# Patient Record
Sex: FEMALE | Race: WHITE | NOT HISPANIC OR LATINO
[De-identification: names, ages, dates, MRNs, and addresses within clinical notes are randomized per-mention and may not be internally consistent; named-entity substitution may affect disease eponyms.]

---

## 2018-04-11 PROBLEM — Z00.00 ENCOUNTER FOR PREVENTIVE HEALTH EXAMINATION: Status: ACTIVE | Noted: 2018-04-11

## 2018-04-17 ENCOUNTER — APPOINTMENT (OUTPATIENT)
Dept: ORTHOPEDIC SURGERY | Facility: CLINIC | Age: 48
End: 2018-04-17
Payer: MEDICARE

## 2018-04-17 VITALS — HEIGHT: 63 IN | BODY MASS INDEX: 26.4 KG/M2 | WEIGHT: 149 LBS

## 2018-04-17 DIAGNOSIS — Z78.9 OTHER SPECIFIED HEALTH STATUS: ICD-10-CM

## 2018-04-17 DIAGNOSIS — Z80.9 FAMILY HISTORY OF MALIGNANT NEOPLASM, UNSPECIFIED: ICD-10-CM

## 2018-04-17 PROCEDURE — 99204 OFFICE O/P NEW MOD 45 MIN: CPT

## 2018-04-30 DIAGNOSIS — M75.02 ADHESIVE CAPSULITIS OF LEFT SHOULDER: ICD-10-CM

## 2018-05-08 DIAGNOSIS — M75.42 IMPINGEMENT SYNDROME OF LEFT SHOULDER: ICD-10-CM

## 2021-09-29 ENCOUNTER — APPOINTMENT (OUTPATIENT)
Dept: ORTHOPEDIC SURGERY | Facility: CLINIC | Age: 51
End: 2021-09-29
Payer: MEDICAID

## 2021-09-29 ENCOUNTER — RESULT REVIEW (OUTPATIENT)
Age: 51
End: 2021-09-29

## 2021-09-29 ENCOUNTER — OUTPATIENT (OUTPATIENT)
Dept: OUTPATIENT SERVICES | Facility: HOSPITAL | Age: 51
LOS: 1 days | End: 2021-09-29
Payer: MEDICAID

## 2021-09-29 VITALS — WEIGHT: 157 LBS | RESPIRATION RATE: 16 BRPM | HEIGHT: 63 IN | BODY MASS INDEX: 27.82 KG/M2

## 2021-09-29 PROCEDURE — 73610 X-RAY EXAM OF ANKLE: CPT | Mod: 26,RT

## 2021-09-29 PROCEDURE — 73630 X-RAY EXAM OF FOOT: CPT | Mod: 26,RT

## 2021-09-29 PROCEDURE — 99214 OFFICE O/P EST MOD 30 MIN: CPT

## 2021-09-29 PROCEDURE — 73610 X-RAY EXAM OF ANKLE: CPT

## 2021-09-29 PROCEDURE — 73630 X-RAY EXAM OF FOOT: CPT

## 2021-10-04 ENCOUNTER — NON-APPOINTMENT (OUTPATIENT)
Age: 51
End: 2021-10-04

## 2021-10-20 ENCOUNTER — APPOINTMENT (OUTPATIENT)
Dept: ORTHOPEDIC SURGERY | Facility: CLINIC | Age: 51
End: 2021-10-20
Payer: MEDICAID

## 2021-10-20 ENCOUNTER — OUTPATIENT (OUTPATIENT)
Dept: OUTPATIENT SERVICES | Facility: HOSPITAL | Age: 51
LOS: 1 days | End: 2021-10-20
Payer: MEDICAID

## 2021-10-20 ENCOUNTER — APPOINTMENT (OUTPATIENT)
Dept: RADIOLOGY | Facility: CLINIC | Age: 51
End: 2021-10-20

## 2021-10-20 ENCOUNTER — RESULT REVIEW (OUTPATIENT)
Age: 51
End: 2021-10-20

## 2021-10-20 VITALS — RESPIRATION RATE: 16 BRPM | BODY MASS INDEX: 27.82 KG/M2 | HEIGHT: 63 IN | WEIGHT: 157 LBS

## 2021-10-20 PROCEDURE — 28470 CLTX METATARSAL FX WO MNP EA: CPT | Mod: RT

## 2021-10-20 PROCEDURE — 77085 DXA BONE DENSITY AXL VRT FX: CPT

## 2021-10-20 PROCEDURE — 99214 OFFICE O/P EST MOD 30 MIN: CPT | Mod: 25

## 2021-10-20 PROCEDURE — 77085 DXA BONE DENSITY AXL VRT FX: CPT | Mod: 26

## 2021-10-21 ENCOUNTER — NON-APPOINTMENT (OUTPATIENT)
Age: 51
End: 2021-10-21

## 2021-10-29 ENCOUNTER — NON-APPOINTMENT (OUTPATIENT)
Age: 51
End: 2021-10-29

## 2021-11-01 ENCOUNTER — TRANSCRIPTION ENCOUNTER (OUTPATIENT)
Age: 51
End: 2021-11-01

## 2021-11-02 ENCOUNTER — APPOINTMENT (OUTPATIENT)
Dept: ORTHOPEDIC SURGERY | Facility: CLINIC | Age: 51
End: 2021-11-02
Payer: MEDICAID

## 2021-11-02 ENCOUNTER — TRANSCRIPTION ENCOUNTER (OUTPATIENT)
Age: 51
End: 2021-11-02

## 2021-11-02 PROCEDURE — 99024 POSTOP FOLLOW-UP VISIT: CPT

## 2021-11-04 ENCOUNTER — NON-APPOINTMENT (OUTPATIENT)
Age: 51
End: 2021-11-04

## 2021-11-04 ENCOUNTER — TRANSCRIPTION ENCOUNTER (OUTPATIENT)
Age: 51
End: 2021-11-04

## 2021-12-01 ENCOUNTER — RESULT REVIEW (OUTPATIENT)
Age: 51
End: 2021-12-01

## 2021-12-01 ENCOUNTER — APPOINTMENT (OUTPATIENT)
Dept: ORTHOPEDIC SURGERY | Facility: CLINIC | Age: 51
End: 2021-12-01
Payer: MEDICAID

## 2021-12-01 ENCOUNTER — OUTPATIENT (OUTPATIENT)
Dept: OUTPATIENT SERVICES | Facility: HOSPITAL | Age: 51
LOS: 1 days | End: 2021-12-01
Payer: MEDICAID

## 2021-12-01 VITALS — WEIGHT: 157 LBS | RESPIRATION RATE: 16 BRPM | HEIGHT: 63 IN | BODY MASS INDEX: 27.82 KG/M2

## 2021-12-01 DIAGNOSIS — M21.6X1 OTHER ACQUIRED DEFORMITIES OF RIGHT FOOT: ICD-10-CM

## 2021-12-01 DIAGNOSIS — Q66.70 CONGEN PES CAVUS, UNSP FOOT: ICD-10-CM

## 2021-12-01 PROCEDURE — 73630 X-RAY EXAM OF FOOT: CPT

## 2021-12-01 PROCEDURE — 73630 X-RAY EXAM OF FOOT: CPT | Mod: 26,RT

## 2021-12-01 PROCEDURE — 99024 POSTOP FOLLOW-UP VISIT: CPT

## 2021-12-01 RX ORDER — DIAZEPAM 2 MG/1
2 TABLET ORAL
Qty: 3 | Refills: 0 | Status: DISCONTINUED | COMMUNITY
Start: 2018-04-24 | End: 2021-12-01

## 2022-02-23 ENCOUNTER — APPOINTMENT (OUTPATIENT)
Dept: ORTHOPEDIC SURGERY | Facility: CLINIC | Age: 52
End: 2022-02-23
Payer: MEDICAID

## 2022-02-23 ENCOUNTER — RESULT REVIEW (OUTPATIENT)
Age: 52
End: 2022-02-23

## 2022-02-23 ENCOUNTER — OUTPATIENT (OUTPATIENT)
Dept: OUTPATIENT SERVICES | Facility: HOSPITAL | Age: 52
LOS: 1 days | End: 2022-02-23
Payer: MEDICAID

## 2022-02-23 VITALS — BODY MASS INDEX: 27.82 KG/M2 | HEIGHT: 63 IN | RESPIRATION RATE: 16 BRPM | WEIGHT: 157 LBS

## 2022-02-23 DIAGNOSIS — M84.374A STRESS FRACTURE, RIGHT FOOT, INITIAL ENCOUNTER FOR FRACTURE: ICD-10-CM

## 2022-02-23 PROCEDURE — 73630 X-RAY EXAM OF FOOT: CPT

## 2022-02-23 PROCEDURE — 73630 X-RAY EXAM OF FOOT: CPT | Mod: 26,RT

## 2022-02-23 PROCEDURE — 99214 OFFICE O/P EST MOD 30 MIN: CPT

## 2022-02-23 RX ORDER — LISDEXAMFETAMINE DIMESYLATE 10 MG/1
CAPSULE ORAL
Refills: 0 | Status: ACTIVE | COMMUNITY

## 2022-02-23 RX ORDER — ESCITALOPRAM OXALATE 5 MG/1
TABLET, FILM COATED ORAL
Refills: 0 | Status: ACTIVE | COMMUNITY

## 2022-03-07 ENCOUNTER — TRANSCRIPTION ENCOUNTER (OUTPATIENT)
Age: 52
End: 2022-03-07

## 2022-04-27 ENCOUNTER — APPOINTMENT (OUTPATIENT)
Dept: ORTHOPEDIC SURGERY | Facility: CLINIC | Age: 52
End: 2022-04-27
Payer: MEDICAID

## 2022-04-27 VITALS — BODY MASS INDEX: 27.82 KG/M2 | WEIGHT: 157 LBS | RESPIRATION RATE: 16 BRPM | HEIGHT: 63 IN

## 2022-04-27 DIAGNOSIS — R22.41 LOCALIZED SWELLING, MASS AND LUMP, RIGHT LOWER LIMB: ICD-10-CM

## 2022-04-27 PROCEDURE — 99213 OFFICE O/P EST LOW 20 MIN: CPT

## 2022-04-27 NOTE — DISCUSSION/SUMMARY
[de-identified] : 4/27/22\par 51-year-old female with subcutaneous mass over the dorsal midfoot that leads to compression neuritis over dorsal midfoot with shoewear.  The patient is scheduled for mass removal about her dorsal midfoot.  We discussed at length all the patient's questions and concerns regarding the surgical intervention as well as appropriate expectations for postoperative period.  The patient is concerned about being able to do her work activities including providing 2 hours in the postoperative period.  The patient was explained that she should dedicate enough time for appropriate healing the postoperative period.  Due to the nature of the patient's work and ambulatory status we will use nylon sutures for closure total limit risk of dehiscence.  The patient will be allowed to walk postoperatively.  We will follow-up the pathology postoperatively.  \par Plan for surgery rescheduled

## 2022-04-27 NOTE — PHYSICAL EXAM
[de-identified] : Pleasant well-appearing 50-year-old female\par \par Right foot\par 1 x 0.5 cm superficial mass over dorsal midfoot\par Mild tenderness to palpation with compression\par Resolution of neuritic type symptoms of the dorsal midfoot\par Resolution of tenderness to palpation over the fourth toe\par No crepitus\par No false motion\par No tenderness to palpation over the third metatarsal base\par No residual swelling  over the right forefoot\par Negative piano key testing\par Negative midfoot compression test\par Appearance: Skin is intact. There is no skin breakdown. There were no lesions. There is no erythema\par Vascular: Warm and well perfused foot. There is a palpable 2+ dorsalis pedis and posterior tibialis artery. Brisk capillary refill to all 5 digits. \par Sensation otherwise intact to light touch in saphenous, sural, superficial peroneal, deep peroneal, and tibial nerve distributions. \par Motors: Able to fire extensor hallucis longus, flexor hallucis longus, tibialis anterior, gastrocsoleus complex. 5 out of 5 strength in all other myotomes. \par Lymph: No evidence of lymphedema, venous stasis ulcers, or pitting edema  [de-identified] : 02/23/2022 Weightbearing X-rays three views of the right foot were ordered, obtained at [[facility]] and interpreted by me today and reviewed with the patient showing: \par Healed fourth metatarsal base fracture\par \par 12/01/2021 Weightbearing X-rays three views of the right foot were ordered, obtained at [[facility]] and interpreted by me today and reviewed with the patient showing:\par Bridging callus about the fourth metatarsal base\par No evidence of displacement\par Minimal soft tissue swelling\par \par Moderate metatarsal stacking\par \par 10/20/21 MRI [w/o contrast] of right foot performed at [[facility]       } on [10/11/21] were interpreted by me today and reviewed with the patient showing:\par Fourth metatarsal base stress fracture\par Early degenerative changes about the first metatarsophalangeal joint\par No evidence of sarcomatous or metastatic mass about the dorsal midfoot\par \par DEXA scan was reviewed which shows no evidence of osteoporosis or osteopenia.\par \par 09/29/2021 Weightbearing X-rays three views of the right foot and ankle were ordered, obtained at [[facility]] and interpreted by me today and reviewed with the patient showing: \par Mild metatarsal stacking\par No evidence of displaced fracture subluxations or dislocations\par Mild soft tissue swelling over the right forefoot and midfoot\par Ankle mortise intact\par Increased calcaneal pitch consistent with pes cavus\par No evidence of periosteal reaction or osseous lytic lesion

## 2022-04-27 NOTE — HISTORY OF PRESENT ILLNESS
[FreeTextEntry1] : 51-year-old female with healed right fourth metatarsal base stress fracture. She remains to have her superficial soft tissue mass over her dorsal forefoot. She presents WB in regular shoes and reports that pain is a 2/10 in severity. She adds that she would like to move her surgery to a week later.

## 2022-04-28 ENCOUNTER — TRANSCRIPTION ENCOUNTER (OUTPATIENT)
Age: 52
End: 2022-04-28

## 2022-05-03 ENCOUNTER — APPOINTMENT (OUTPATIENT)
Age: 52
End: 2022-05-03

## 2022-05-17 ENCOUNTER — APPOINTMENT (OUTPATIENT)
Age: 52
End: 2022-05-17

## 2022-06-01 ENCOUNTER — APPOINTMENT (OUTPATIENT)
Dept: ORTHOPEDIC SURGERY | Facility: CLINIC | Age: 52
End: 2022-06-01

## 2023-01-13 ENCOUNTER — APPOINTMENT (OUTPATIENT)
Dept: OTOLARYNGOLOGY | Facility: CLINIC | Age: 53
End: 2023-01-13
Payer: MEDICAID

## 2023-01-13 VITALS
TEMPERATURE: 97.7 F | BODY MASS INDEX: 29.77 KG/M2 | SYSTOLIC BLOOD PRESSURE: 102 MMHG | DIASTOLIC BLOOD PRESSURE: 70 MMHG | HEIGHT: 63 IN | WEIGHT: 168 LBS | OXYGEN SATURATION: 98 % | HEART RATE: 64 BPM

## 2023-01-13 DIAGNOSIS — R09.89 OTHER SPECIFIED SYMPTOMS AND SIGNS INVOLVING THE CIRCULATORY AND RESPIRATORY SYSTEMS: ICD-10-CM

## 2023-01-13 DIAGNOSIS — H61.23 IMPACTED CERUMEN, BILATERAL: ICD-10-CM

## 2023-01-13 DIAGNOSIS — H91.93 UNSPECIFIED HEARING LOSS, BILATERAL: ICD-10-CM

## 2023-01-13 DIAGNOSIS — K21.9 GASTRO-ESOPHAGEAL REFLUX DISEASE W/OUT ESOPHAGITIS: ICD-10-CM

## 2023-01-13 DIAGNOSIS — K13.79 OTHER LESIONS OF ORAL MUCOSA: ICD-10-CM

## 2023-01-13 PROCEDURE — 31579 LARYNGOSCOPY TELESCOPIC: CPT

## 2023-01-13 PROCEDURE — 99205 OFFICE O/P NEW HI 60 MIN: CPT | Mod: 25

## 2023-01-13 RX ORDER — OMEPRAZOLE 40 MG/1
40 CAPSULE, DELAYED RELEASE ORAL
Qty: 90 | Refills: 0 | Status: ACTIVE | COMMUNITY
Start: 2023-01-13 | End: 1900-01-01

## 2023-01-13 RX ORDER — CHOLECALCIFEROL (VITAMIN D3) 1250 MCG
1.25 MG CAPSULE ORAL
Qty: 10 | Refills: 0 | Status: DISCONTINUED | COMMUNITY
Start: 2021-10-20 | End: 2023-01-13

## 2023-01-13 RX ORDER — BUPROPION HYDROCHLORIDE 150 MG/1
150 TABLET, FILM COATED ORAL
Refills: 0 | Status: ACTIVE | COMMUNITY

## 2023-01-13 RX ORDER — MELOXICAM 15 MG/1
15 TABLET ORAL
Qty: 90 | Refills: 0 | Status: DISCONTINUED | COMMUNITY
Start: 2021-09-29 | End: 2023-01-13

## 2023-01-13 RX ORDER — FAMOTIDINE 20 MG/1
20 TABLET, FILM COATED ORAL
Qty: 90 | Refills: 0 | Status: ACTIVE | COMMUNITY
Start: 2023-01-13 | End: 1900-01-01

## 2023-01-13 NOTE — ASSESSMENT
[FreeTextEntry1] : 52 year old female presents with throat discomfort, globus sensation and odynophagia for several months. Based on history and physical exam, I do believe there is a component of laryngopharyngeal reflux. At this time I am recommending dietary and behavioral change to reduce acid in the diet. I am also recommending omeprazole as well famotidine for a 3 months trial. Follow up in 3 months for repeat evaluation. \par \par Of note, bilateral EAC's were cleaned without issue. \par \par Plan: \par - Dietary and behavioral modification to reduce acid reflux, handout given \par - Omeprazole qd as well as Famotidine qhs \par - Voice hygiene, increase hydration, sips of water throughout the day, avoid throat clearing\par - fu 3 months for repeat evaluation \par - audio/tymp

## 2023-01-13 NOTE — PROCEDURE
[FreeTextEntry3] : -\par Cerumen Removal/Ear Cleaning for Otitis Externa\par Pre-operative Diagnosis: bilateral Cerumen Impaction\par Post-operative Diagnosis: Same\par Procedure: Binocular microscopy with cerumen removal- 19714\par Procedure Details: \par The patient was placed in the supine position. The operating microscope was positioned. I then placed the ear speculum in the EAC. Cerumen was then removed using a mixture of otologic curettes, and suction. The TM was noted to be intact. I then performed the procedure of the opposite ear in similar fashion. The patient tolerated procedure well.\par \par Findings: \par Bilateral Ear Canal - normal\par Bilateral Tympanic Membrane - normal\par \par Recommendations: Debrox\par Complications: None\par \par  [de-identified] : -\par Procedure: Flexible Laryngoscopy with Stroboscopy\par \par Pre-operative Diagnosis: throat discomfort \par Post-operative Diagnosis: laryngopharyngeal reflux\par Anesthesia: Topical - 1% Lidocaine/Phenylephrine \par \par Procedure Details: \par The patient was placed in the sitting position. After decongestant and anesthesia were applied the laryngoscope was passed. The nasal cavities, nasopharynx, oropharynx, hypopharynx, and larynx were all examined. Vocal folds were examined during respiration and phonation. The following findings were noted:\par \par Findings: \par Nose: Septum is midline, turbinates are normal, nasal airways patent, mucosa normal\par Nasopharynx: Adenoids normal, no masses, eustachian tube normal\par Oropharynx: Pharyngeal walls symmetric and without lesion. Tonsils/fossae symmetric\par Hypopharynx: Hypopharynx and pyriform sinuses without lesion. No masses or asymmetry. No pooling of secretions.\par Larynx: Epiglottis and aryepiglottic folds were sharp and crisp bilaterally. Bilateral false vocal folds normal appearance. Airway was widely patent.\par \par Strobe Exam Ratings\par 		\par TVF Appearance: normal \par TVF Mobility: normal \par Edema/hypertrophy: normal \par Mucus on TVF: none\par Glottic Closure: adequate \par Mucosal Wave: normal \par Amplitude of Vibration: normal \par Phase: symmetric \par Supraglottic Hyperfunction: none\par Other Findings:\par \par Condition: Stable. Patient tolerated procedure well.\par \par Complications: None\par \par

## 2023-01-13 NOTE — PROCEDURE
[FreeTextEntry3] : -\par Cerumen Removal/Ear Cleaning for Otitis Externa\par Pre-operative Diagnosis: bilateral Cerumen Impaction\par Post-operative Diagnosis: Same\par Procedure: Binocular microscopy with cerumen removal- [id number]\par Procedure Details: \par The patient was placed in the supine position. The operating microscope was positioned. I then placed the ear speculum in the EAC. Cerumen was then removed using a mixture of otologic curettes, and suction. The TM was noted to be intact. I then performed the procedure of the opposite ear in similar fashion. The patient tolerated procedure well.\par \par Findings: \par Bilateral Ear Canal - normal\par Bilateral Tympanic Membrane - normal\par \par Recommendations: Debrox\par Complications: None\par \par  [de-identified] : -\par Procedure: Flexible Laryngoscopy with Stroboscopy\par \par Pre-operative Diagnosis: throat discomfort \par Post-operative Diagnosis: laryngopharyngeal reflux\par Anesthesia: Topical - 1% Lidocaine/Phenylephrine \par \par Procedure Details: \par The patient was placed in the sitting position. After decongestant and anesthesia were applied the laryngoscope was passed. The nasal cavities, nasopharynx, oropharynx, hypopharynx, and larynx were all examined. Vocal folds were examined during respiration and phonation. The following findings were noted:\par \par Findings: \par Nose: Septum is midline, turbinates are normal, nasal airways patent, mucosa normal\par Nasopharynx: Adenoids normal, no masses, eustachian tube normal\par Oropharynx: Pharyngeal walls symmetric and without lesion. Tonsils/fossae symmetric\par Hypopharynx: Hypopharynx and pyriform sinuses without lesion. No masses or asymmetry. No pooling of secretions.\par Larynx: Epiglottis and aryepiglottic folds were sharp and crisp bilaterally. Bilateral false vocal folds normal appearance. Airway was widely patent.\par \par Strobe Exam Ratings\par 		\par TVF Appearance: normal \par TVF Mobility: normal \par Edema/hypertrophy: normal \par Mucus on TVF: none\par Glottic Closure: adequate \par Mucosal Wave: normal \par Amplitude of Vibration: normal \par Phase: symmetric \par Supraglottic Hyperfunction: none\par Other Findings:\par \par Condition: Stable. Patient tolerated procedure well.\par \par Complications: None\par \par

## 2023-01-13 NOTE — PHYSICAL EXAM
[Midline] : trachea located in midline position [Laryngoscopy Performed] : laryngoscopy was performed, see procedure section for findings [Normal] : no rashes [de-identified] : bilateral EACs cleaned without issue

## 2023-01-13 NOTE — HISTORY OF PRESENT ILLNESS
[de-identified] : 1/13/23\par 52F presents with odynophagia and "lump on left side of throat". Symptoms started a few months ago and are intermittent. Not worse after meals. + excessive throat clearing. She also reports coughing fits that are brought on by laughing. No difficulty chewing, eating or swallowing. No breathing issues. No voice changes. No other ENT issues. She was told that she has acid reflux in the past and was started on Nexium.\par \par She reports chronic "sinus headaches" on the left side. Denies nasal congestion, drainage. Denies changes in sense of smell or taste or dental pain. \par \par Diet:\par + caffeine, chocolate, tomato, citrus, garlic, onion, carbonated beverages, blueberries, spicy foods\par - mint\par Glasses of water per day: almost none\par Late night eating: yes\par Alcohol use, especially at night:

## 2023-01-13 NOTE — PHYSICAL EXAM
[Midline] : trachea located in midline position [Laryngoscopy Performed] : laryngoscopy was performed, see procedure section for findings [Normal] : no rashes [de-identified] : bilateral EACs cleaned without issue

## 2023-01-13 NOTE — HISTORY OF PRESENT ILLNESS
[de-identified] : 1/13/23\par 52F presents with odynophagia and "lump on left side of throat". Symptoms started a few months ago and are intermittent. Not worse after meals. + excessive throat clearing. She also reports coughing fits that are brought on by laughing. No difficulty chewing, eating or swallowing. No breathing issues. No voice changes. No other ENT issues. She was told that she has acid reflux in the past and was started on Nexium.\par \par She reports chronic "sinus headaches" on the left side. Denies nasal congestion, drainage. Denies changes in sense of smell or taste or dental pain. \par \par Diet:\par + caffeine, chocolate, tomato, citrus, garlic, onion, carbonated beverages, blueberries, spicy foods\par - mint\par Glasses of water per day: almost none\par Late night eating: yes\par Alcohol use, especially at night:

## 2023-01-23 ENCOUNTER — APPOINTMENT (OUTPATIENT)
Dept: OTOLARYNGOLOGY | Facility: CLINIC | Age: 53
End: 2023-01-23
Payer: MEDICAID

## 2023-01-23 DIAGNOSIS — R13.10 DYSPHAGIA, UNSPECIFIED: ICD-10-CM

## 2023-01-23 PROCEDURE — 99212 OFFICE O/P EST SF 10 MIN: CPT | Mod: 25

## 2023-01-23 PROCEDURE — 31575 DIAGNOSTIC LARYNGOSCOPY: CPT

## 2023-01-23 RX ORDER — SULFASALAZINE 500 MG/1
500 TABLET, DELAYED RELEASE ORAL
Qty: 120 | Refills: 0 | Status: ACTIVE | COMMUNITY
Start: 2022-08-23

## 2023-01-23 RX ORDER — BUPROPION HYDROCHLORIDE 150 MG/1
150 TABLET, EXTENDED RELEASE ORAL
Qty: 30 | Refills: 0 | Status: ACTIVE | COMMUNITY
Start: 2023-01-09

## 2023-01-23 RX ORDER — ESCITALOPRAM OXALATE 10 MG/1
10 TABLET ORAL
Qty: 30 | Refills: 0 | Status: ACTIVE | COMMUNITY
Start: 2023-01-09

## 2023-01-23 NOTE — CONSULT LETTER
[Dear  ___] : Dear  [unfilled], [Courtesy Letter:] : I had the pleasure of seeing your patient, [unfilled], in my office today. [Consult Closing:] : Thank you very much for allowing me to participate in the care of this patient.  If you have any questions, please do not hesitate to contact me. [Sincerely,] : Sincerely, [FreeTextEntry3] : Migel Fam MD\par Department of Otolaryngology - Head and Neck Surgery\par [facility]

## 2023-01-23 NOTE — HISTORY OF PRESENT ILLNESS
[de-identified] : 52F here for initial evaluation.\par \par For the past 2 months or so she c/o uncomfortable and painful feeling in her throat on the left side only when swallowing. It has gotten worse during this time. Other than swallowing there are no triggers. There is no difficulty eating, breathing, swallowing or talking; there is no weight loss or bad breath. \par She had seen ENT who reported reflux and prescribed PPH/H2B but pt did not take them yet.\par No etoh or tobacco; occ MJ.\par Very poor diet- caffeine, chocolate, tomato, citrus, garlic, onion, carbonated beverages, blueberries, spicy foods, late night eating.\par \par ROS otherwise unremarkable

## 2023-01-23 NOTE — ASSESSMENT
[FreeTextEntry1] : 52F here for initial evaluation. For the past 2 months or so she c/o an uncomfortable and painful feeling in her throat on the left side only when swallowing. It has gotten worse during this time. Other than swallowing there are no triggers. There is no difficulty eating, breathing, swallowing or talking; there is no weight loss or bad breath. She had seen ENT who reported sx due to reflux and prescribed PPH/H2B but pt did not take them yet. She has a reflux unfriendly diet as above. On exam, there is pharyngeal cobblestoning and erythema. Fiberoptic laryngoscopy shows mild postcricoid edema.\par Unclear etiology to sx, but I agree it is likely laryngopharyngeal reflux given exam and history. However, her sx are persisting >2months and getting worse so to be complete will get CT neck and RTO thereafter. Should scan be unremarkable (expected) than would treat for reflux.

## 2023-01-23 NOTE — PHYSICAL EXAM
[FreeTextEntry1] : +throat clearing [de-identified] : soft, flat [Midline] : trachea located in midline position [Laryngoscopy Performed] : laryngoscopy was performed, see procedure section for findings [de-identified] : pharyngeal cobblestoning and erythema [Normal] : no rashes

## 2023-01-23 NOTE — PROCEDURE
[FreeTextEntry3] : Fiberoptic Laryngoscopy:\par upper airway widely patent\par TVF symmetric and mobile\par no masses\par mild postcricoid edema

## 2023-01-27 ENCOUNTER — OUTPATIENT (OUTPATIENT)
Dept: OUTPATIENT SERVICES | Facility: HOSPITAL | Age: 53
LOS: 1 days | End: 2023-01-27
Payer: MEDICAID

## 2023-01-27 ENCOUNTER — APPOINTMENT (OUTPATIENT)
Dept: CT IMAGING | Facility: HOSPITAL | Age: 53
End: 2023-01-27

## 2023-01-27 PROCEDURE — 70491 CT SOFT TISSUE NECK W/DYE: CPT

## 2023-01-27 PROCEDURE — 70491 CT SOFT TISSUE NECK W/DYE: CPT | Mod: 26

## 2023-02-14 ENCOUNTER — APPOINTMENT (OUTPATIENT)
Dept: OTOLARYNGOLOGY | Facility: CLINIC | Age: 53
End: 2023-02-14

## 2023-04-10 ENCOUNTER — APPOINTMENT (OUTPATIENT)
Dept: OTOLARYNGOLOGY | Facility: CLINIC | Age: 53
End: 2023-04-10

## 2023-04-21 ENCOUNTER — APPOINTMENT (OUTPATIENT)
Dept: OTOLARYNGOLOGY | Facility: CLINIC | Age: 53
End: 2023-04-21

## 2023-05-09 ENCOUNTER — APPOINTMENT (OUTPATIENT)
Dept: OTOLARYNGOLOGY | Facility: CLINIC | Age: 53
End: 2023-05-09
Payer: MEDICAID

## 2023-05-09 DIAGNOSIS — R07.0 PAIN IN THROAT: ICD-10-CM

## 2023-05-09 PROCEDURE — 31575 DIAGNOSTIC LARYNGOSCOPY: CPT

## 2023-05-09 PROCEDURE — 99214 OFFICE O/P EST MOD 30 MIN: CPT | Mod: 25

## 2023-05-09 NOTE — PROCEDURE
[FreeTextEntry3] : Fiberoptic Laryngoscopy (from prior):\par upper airway widely patent\par TVF symmetric and mobile\par no masses\par mild postcricoid edema

## 2023-05-09 NOTE — ASSESSMENT
[FreeTextEntry1] : 52F here in followup. For the past 6 months or so she c/o uncomfortable and painful feeling in her neck/throat on the left side, only when swallowing or moving her neck to the left. It has gotten worse during this time. Other than swallowing or movement there are no triggers. There is no difficulty eating, breathing, swallowing or talking; there is no weight loss or bad breath. She tried PPI/H2B and diet/lifestyle changes with no improvement. CT neck, though substantially degraded by motion and dental artifact, shows a 1.5cm focus of heterogeneous soft tissue density inferior to the central hyoid that may represent a thyroglossal duct remnant with otherwise no other masses or lesions. Complete and comprehensive head and neck exam is unremarkable.\par Unclear etiology to sx as her objective exam and imaging are unremarkable. The small likely TGDC on imaging is likely incidental and no where near her c/o lower left sided neck pain. Doubt LPR since no improvement on PPI/H2B w diet/lifestyle. At this point, would send back to PCP - perhaps this is musculoskeletal? c-spine related? No acute ENT intervention at this time.

## 2023-05-09 NOTE — CONSULT LETTER
[Dear  ___] : Dear  [unfilled], [Courtesy Letter:] : I had the pleasure of seeing your patient, [unfilled], in my office today. [Consult Closing:] : Thank you very much for allowing me to participate in the care of this patient.  If you have any questions, please do not hesitate to contact me. [Sincerely,] : Sincerely, [FreeTextEntry3] : Migel Fam MD\par Department of Otolaryngology - Head and Neck Surgery\par [facility]

## 2023-05-09 NOTE — HISTORY OF PRESENT ILLNESS
[de-identified] : 52F here in followup.\par \par For the past 6 months or so she c/o uncomfortable and painful feeling in her neck/throat on the left side, only when swallowing or moving her neck to the left. It has gotten worse during this time. Other than swallowing or movement there are no triggers. There is no difficulty eating, breathing, swallowing or talking; there is no weight loss or bad breath. \par She tried PPI/H2B and diet/lifestyle changes with no improvement. \par No etoh or tobacco; occ MJ.\par \par CT Neck 1/27/23 (I reviewed images):\par -On a study that is substantially degraded by motion and dental artifact, there is the appearance of a 1.5 cm focus of heterogeneous soft tissue density inferior to the central hyoid that may represent a thyroglossal duct remnant. \par \par ROS otherwise unremarkable

## 2023-05-09 NOTE — DATA REVIEWED
[de-identified] : CT Neck 1/27/23:\par Findings:\par \par The study is substantially degraded both by motion and dental artifact.\par \par *  Aerodigestive Tract: Within the exam limitations, no large aerodigestive tract mass is identified. There is an approximately 1.5 cm focus of heterogeneous soft tissue density immediately inferior to the central hyoid that may represent a thyroglossal duct remnant. There is a vague multiseptated appearance that may reflect inflammation though, again, accurate characterization isn't possible on this limited examination.\par \par *  Lymph Nodes: No cervical lymphadenopathy\par \par *  Salivary Glands: No large mass.\par \par *  Thyroid Gland: Normal\par \par *  Orbits: Normal\par \par *  Brain: Included portions are grossly unremarkable\par \par *  Skull Base: Intact\par \par *  Paranasal Sinuses: Clear\par \par *  Mastoids: Clear\par \par *  Cervical Spine: Normal vertebral body height and alignment.\par \par *  Lung Apices: No large apical lung mass\par \par \par IMPRESSION:\par \par On a study that is substantially degraded by motion and dental artifact, there is the appearance of a 1.5 cm focus of heterogeneous soft tissue density inferior to the central hyoid that may represent a thyroglossal duct remnant. Should confirmation and/or further imaging characterization be desired, ultrasound may be performed. MRI will be more sensitive to motion related image degradation.\par

## 2023-05-09 NOTE — PHYSICAL EXAM
[FreeTextEntry1] : +throat clearing [de-identified] : soft, flat, no masses/lesions, no LAD [Midline] : trachea located in midline position [Laryngoscopy Performed] : laryngoscopy was performed, see procedure section for findings [de-identified] : pharyngeal cobblestoning and erythema [Normal] : no rashes

## 2023-07-27 ENCOUNTER — APPOINTMENT (OUTPATIENT)
Dept: SURGERY | Facility: CLINIC | Age: 53
End: 2023-07-27
Payer: MEDICAID

## 2023-07-27 VITALS
WEIGHT: 178 LBS | HEIGHT: 63 IN | HEART RATE: 60 BPM | BODY MASS INDEX: 31.54 KG/M2 | SYSTOLIC BLOOD PRESSURE: 121 MMHG | DIASTOLIC BLOOD PRESSURE: 81 MMHG

## 2023-07-27 DIAGNOSIS — Q89.2 CONGENITAL MALFORMATIONS OF OTHER ENDOCRINE GLANDS: ICD-10-CM

## 2023-07-27 PROCEDURE — 99204 OFFICE O/P NEW MOD 45 MIN: CPT

## 2023-07-27 NOTE — REASON FOR VISIT
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

## 2023-07-27 NOTE — ASSESSMENT
[FreeTextEntry1] : discussed options for management including observation vs excision. risks, benefits and alternatives discussed at length.  I have discussed with the patient the anatomy of the area, the pathophysiology of the disease process and the rationale for surgery.  The attendant risks, possible complications and expected postoperative course have been discussed in detail.  I have given the patient the opportunity to ask questions, and all questions have been answered to the patient's satisfaction, and they wish to proceed with the planned procedure.  to be scheduled ambulatory at [facility].

## 2023-07-27 NOTE — CONSULT LETTER
[Dear  ___] : Dear  [unfilled], [Consult Letter:] : I had the pleasure of evaluating your patient, [unfilled]. [Please see my note below.] : Please see my note below. [Consult Closing:] : Thank you very much for allowing me to participate in the care of this patient.  If you have any questions, please do not hesitate to contact me. [Sincerely,] : Sincerely, [FreeTextEntry2] : Dr. Harshad James [FreeTextEntry3] : Hernandez Perez MD, FACS\par System Director, Endocrine Surgery\par [facility]\par Associate  Professor of Surgery\par [facility] [employer] [facility]

## 2023-07-27 NOTE — PHYSICAL EXAM
[de-identified] : 1.5 cm mass overlying thyrohyoid membrane, well circumscribed, mobile, moves with deglutition [de-identified] : no palpable thyroid nodules [Laryngoscopy Performed] : laryngoscopy was performed, see procedure section for findings [Midline] : located in midline position [Normal] : orientation to person, place, and time: normal [de-identified] : indirect  laryngoscopy shows normal vocal cord mobility bilaterally with no lesions noted

## 2023-07-27 NOTE — HISTORY OF PRESENT ILLNESS
[de-identified] : Pt c/o dysphagia and hoarseness.  denies SOB or RT exposure\par CT scan:  1.5 cm soft tissue possible thyroglossal duct cyst\par MRI:  multilobulated cystic mass overlying thyroid cartilage\par I have reviewed all old and new data and available images.\par

## 2023-12-08 ENCOUNTER — APPOINTMENT (OUTPATIENT)
Dept: SURGERY | Facility: HOSPITAL | Age: 53
End: 2023-12-08

## 2023-12-19 ENCOUNTER — APPOINTMENT (OUTPATIENT)
Dept: SURGERY | Facility: CLINIC | Age: 53
End: 2023-12-19